# Patient Record
Sex: MALE | Race: WHITE | Employment: FULL TIME | URBAN - METROPOLITAN AREA
[De-identification: names, ages, dates, MRNs, and addresses within clinical notes are randomized per-mention and may not be internally consistent; named-entity substitution may affect disease eponyms.]

---

## 2020-01-22 ENCOUNTER — TELEPHONE (OUTPATIENT)
Dept: FAMILY MEDICINE CLINIC | Facility: CLINIC | Age: 40
End: 2020-01-22

## 2020-01-22 DIAGNOSIS — Z13.228 SCREENING FOR ENDOCRINE/METABOLIC/IMMUNITY DISORDERS: ICD-10-CM

## 2020-01-22 DIAGNOSIS — Z13.6 SCREENING FOR CARDIOVASCULAR CONDITION: ICD-10-CM

## 2020-01-22 DIAGNOSIS — Z13.0 SCREENING FOR ENDOCRINE/METABOLIC/IMMUNITY DISORDERS: ICD-10-CM

## 2020-01-22 DIAGNOSIS — Z13.29 SCREENING FOR ENDOCRINE/METABOLIC/IMMUNITY DISORDERS: ICD-10-CM

## 2020-01-22 DIAGNOSIS — Z13.0 SCREENING FOR IRON DEFICIENCY ANEMIA: Primary | ICD-10-CM

## 2020-01-22 NOTE — PATIENT INSTRUCTIONS
As of October 6th 2014, the Drug Enforcement Agency [facility]) is reclassifying all hydrocodone combination medications from Schedule III to Schedule II. This includes medications such as Norco, Vicodin, Lortab, Zohydro, and Vicoprofen.      What this means for It's an alert to a threat that is unknown, vague, or comes from your own internal fears. While you’re in this state, your feelings can range from a vague sense of worry to physical sensations such as a pounding heartbeat.  These feelings make you want to re that’s disrupting your life can be treated. Check with your healthcare provider and rule out any physical problems that may be causing the anxiety symptoms. If an anxiety disorder is diagnosed seek mental healthcare.  This is an illness and it can respond t blood pressure can decrease your risk of these problems. Know your blood pressure and remember to check it regularly. Doing so can save your life. Blood pressure measurements are given as 2 numbers. Systolic blood pressure is the upper number.  This is the sweets. · Get regular exercise. Get up and get active  · Choose activities you enjoy. Find ones you can do with friends or family. This includes bicycling, dancing, walking, and jogging. · Park farther away from building entrances.   · Use stairs inste

## 2020-01-24 PROBLEM — F10.20 ALCOHOL DEPENDENCE (HCC): Status: ACTIVE | Noted: 2020-01-24

## 2020-01-24 PROBLEM — F41.9 ANXIETY: Status: ACTIVE | Noted: 2020-01-24

## 2020-01-24 LAB
ABSOLUTE BASOPHILS: 50 CELLS/UL (ref 0–200)
ABSOLUTE EOSINOPHILS: 120 CELLS/UL (ref 15–500)
ABSOLUTE LYMPHOCYTES: 1000 CELLS/UL (ref 850–3900)
ABSOLUTE MONOCYTES: 320 CELLS/UL (ref 200–950)
ABSOLUTE NEUTROPHILS: 3510 CELLS/UL (ref 1500–7800)
ALBUMIN/GLOBULIN RATIO: 1.9 (CALC) (ref 1–2.5)
ALBUMIN: 5.2 G/DL (ref 3.6–5.1)
ALKALINE PHOSPHATASE: 56 U/L (ref 40–115)
ALT: 276 U/L (ref 9–46)
AST: 202 U/L (ref 10–40)
BASOPHILS: 1 %
BILIRUBIN, TOTAL: 1.6 MG/DL (ref 0.2–1.2)
BUN: 11 MG/DL (ref 7–25)
CALCIUM: 9.8 MG/DL (ref 8.6–10.3)
CARBON DIOXIDE: 27 MMOL/L (ref 20–32)
CHLORIDE: 98 MMOL/L (ref 98–110)
CHOL/HDLC RATIO: 2 (CALC)
CHOLESTEROL, TOTAL: 180 MG/DL
CREATININE: 0.77 MG/DL (ref 0.6–1.35)
EGFR IF AFRICN AM: 134 ML/MIN/1.73M2
EGFR IF NONAFRICN AM: 116 ML/MIN/1.73M2
EOSINOPHILS: 2.4 %
GLOBULIN: 2.8 G/DL (CALC) (ref 1.9–3.7)
GLUCOSE: 84 MG/DL (ref 65–99)
HDL CHOLESTEROL: 90 MG/DL
HEMATOCRIT: 44.5 % (ref 38.5–50)
HEMOGLOBIN: 16.1 G/DL (ref 13.2–17.1)
LDL-CHOLESTEROL: 78 MG/DL (CALC)
LYMPHOCYTES: 20 %
MCH: 34.3 PG (ref 27–33)
MCHC: 36.2 G/DL (ref 32–36)
MCV: 94.9 FL (ref 80–100)
MONOCYTES: 6.4 %
MPV: 9.6 FL (ref 7.5–12.5)
NEUTROPHILS: 70.2 %
NON-HDL CHOLESTEROL: 90 MG/DL (CALC)
PLATELET COUNT: 215 THOUSAND/UL (ref 140–400)
POTASSIUM: 3.7 MMOL/L (ref 3.5–5.3)
PROTEIN, TOTAL: 8 G/DL (ref 6.1–8.1)
RDW: 12.6 % (ref 11–15)
RED BLOOD CELL COUNT: 4.69 MILLION/UL (ref 4.2–5.8)
SODIUM: 135 MMOL/L (ref 135–146)
TRIGLYCERIDES: 42 MG/DL
TSH W/REFLEX TO FT4: 3.04 MIU/L (ref 0.4–4.5)
WHITE BLOOD CELL COUNT: 5 THOUSAND/UL (ref 3.8–10.8)

## 2020-01-24 NOTE — TELEPHONE ENCOUNTER
Labs reviewed. Abnormal LFTs, elevated. Needs US Liver. Likely is related EtOH.   Has appt on 1/29/20 with Dr. Daisha Elizabeth to discuss results and for physical.

## 2020-01-25 NOTE — PROGRESS NOTES
CHIEF COMPLAINT: Patient presents with: Other: patient c/o panic attack; heart beating fast, breathing problems, weak   Establish Care: new patient establishing care; not fasting        HPI:     Peter Garcia is a 42year old male presents for anxiety. Use      Smoking status: Never Smoker      Smokeless tobacco: Never Used    Alcohol use: Yes      Frequency: Monthly or less    Drug use: Never       Medications (Active prior to today's visit):  No current outpatient medications on file.        Allergies: 01/22/2020   Component Date Value   • WHITE BLOOD CELL COUNT 01/23/2020 5.0    • RED BLOOD CELL COUNT 01/23/2020 4.69    • HEMOGLOBIN 01/23/2020 16.1    • HEMATOCRIT 01/23/2020 44.5    • MCV 01/23/2020 94.9    • MCH 01/23/2020 34.3*   • MCHC 01/23/2020 36. without history of HTN  Elevated BP in office today, will review [facility] records, as well. If elevated at the next visit, will discuss medications.  In the meantime, encouraged pt to exercise regularly to lost weight, avoid high sodium foods, and to stop dr

## 2020-01-29 ENCOUNTER — OFFICE VISIT (OUTPATIENT)
Dept: FAMILY MEDICINE CLINIC | Facility: CLINIC | Age: 40
End: 2020-01-29
Payer: COMMERCIAL

## 2020-01-29 VITALS
TEMPERATURE: 99 F | WEIGHT: 167 LBS | HEIGHT: 64 IN | HEART RATE: 68 BPM | OXYGEN SATURATION: 98 % | DIASTOLIC BLOOD PRESSURE: 84 MMHG | BODY MASS INDEX: 28.51 KG/M2 | RESPIRATION RATE: 18 BRPM | SYSTOLIC BLOOD PRESSURE: 122 MMHG

## 2020-01-29 DIAGNOSIS — R79.89 ELEVATED LFTS: ICD-10-CM

## 2020-01-29 DIAGNOSIS — Z00.00 ENCOUNTER FOR ROUTINE ADULT HEALTH EXAMINATION WITHOUT ABNORMAL FINDINGS: Primary | ICD-10-CM

## 2020-01-29 DIAGNOSIS — F41.9 ANXIETY: ICD-10-CM

## 2020-01-29 PROCEDURE — 99395 PREV VISIT EST AGE 18-39: CPT | Performed by: FAMILY MEDICINE

## 2020-01-29 NOTE — PATIENT INSTRUCTIONS
As of October 6th 2014, the Drug Enforcement Agency [facility]) is reclassifying all hydrocodone combination medications from Schedule III to Schedule II. This includes medications such as Norco, Vicodin, Lortab, Zohydro, and Vicoprofen.      What this means for managing your health. A screening test is done to find possible disorders or diseases in people who don't have any symptoms.  The goal is to find a disease early so lifestyle changes can be made and you can be watched more closely to reduce the risk of dise infection or vaccine 2 doses; the second dose should be given at least 4 weeks after the first dose   Hepatitis A Men at increased risk for infection – talk with your healthcare provider 2 doses given at least 6 months apart   Hepatitis B Men at increased through age 25 At routine exams   1Those who are 25years of age, who are not up-to-date on their childhood immunizations, should receive all appropriate catch-up vaccines recommended by the CDC.   Date Last Reviewed: 2/1/2017  © 1038-3293 [employer]

## 2020-01-30 NOTE — PROGRESS NOTES
Patient presents with:  Physical      HPI:   Cisco Lyons is a 42year old male who presents for a complete physical exam.   Patient is present for complete physical. Feels very well. Up to date with dental visits. No hearing problems.  Vaccinations: up to d pertinent surgical history.    Family History   Problem Relation Age of Onset   • Cancer Father    • Diabetes Mother    • Other (Other) Maternal Grandmother    • Lung Disorder Maternal Grandfather    • Other (tuberculosis) Paternal Grandmother    • Diabetes normal and symmetric. MUSK/SKEL: Normal muscles tones, no joint abnormalities, full ROM of all extremities. back- normal curves, no scoliosis, normal gait,  No joint or muscle abnormalities. PSYCH: pt is alert, oriented x 3, normal affect.     ASSESSM

## 2020-02-03 ENCOUNTER — TELEPHONE (OUTPATIENT)
Dept: FAMILY MEDICINE CLINIC | Facility: CLINIC | Age: 40
End: 2020-02-03

## 2020-02-03 ENCOUNTER — HOSPITAL ENCOUNTER (OUTPATIENT)
Dept: ULTRASOUND IMAGING | Facility: HOSPITAL | Age: 40
Discharge: HOME | End: 2020-02-03
Attending: FAMILY MEDICINE
Payer: COMMERCIAL

## 2020-02-03 DIAGNOSIS — R79.89 ELEVATED LFTS: ICD-10-CM

## 2020-02-03 PROCEDURE — 76981 USE PARENCHYMA: CPT | Performed by: FAMILY MEDICINE

## 2020-02-03 PROCEDURE — 76705 ECHO EXAM OF ABDOMEN: CPT | Performed by: FAMILY MEDICINE

## 2020-02-03 NOTE — PROGRESS NOTES
Pt notified via [facility]:  US Liver showing hepatic steatosis. Minimal fibrosis seen (metavir score of F1, no tx indicated at this point). Recommend to cut down and avoid EtOH.

## 2020-02-03 NOTE — TELEPHONE ENCOUNTER
KIM, patient seeing a therapist today [id number] for his anxiety Cruz Lenz in [facility]- unable to add to care team  [address], [address] [place] [address]   [phone]

## 2020-02-07 ENCOUNTER — TELEPHONE (OUTPATIENT)
Dept: FAMILY MEDICINE CLINIC | Facility: CLINIC | Age: 40
End: 2020-02-07

## 2020-02-07 NOTE — TELEPHONE ENCOUNTER
Would like to talk to [facility] Colten Whitehead about test results and other issues.

## 2020-02-10 NOTE — TELEPHONE ENCOUNTER
Call placed to patient. He states he has reviewed his US results on mychart, and does not have further questions. He states that he went to his first session of therapy on Saturday, and it is going well- FYI Dr Baljinder Carson.

## 2020-03-18 ENCOUNTER — TELEPHONE (OUTPATIENT)
Dept: FAMILY MEDICINE CLINIC | Facility: CLINIC | Age: 40
End: 2020-03-18

## 2020-03-18 NOTE — TELEPHONE ENCOUNTER
Patient called to report he went to [facility] ED on [place] 3/15/2020 due to scratchy throat.  Released and advised to drink fluids  Patient reports he is fever free and symptoms free  Patient employer asking for a letter  Copy of letter provided to lien

## 2023-03-29 ENCOUNTER — PATIENT OUTREACH (OUTPATIENT)
Dept: CASE MANAGEMENT | Age: 43
End: 2023-03-29

## 2023-03-30 NOTE — PROCEDURES
The office order for PCP removal request is Approved and finalized on March 29, 2023.     Thanks,  [facility] [employer]

## 2024-10-22 ENCOUNTER — TELEPHONE (OUTPATIENT)
Dept: SCHEDULING | Age: 44
End: 2024-10-22

## 2024-10-24 ENCOUNTER — IMAGING SERVICES (OUTPATIENT)
Dept: GENERAL RADIOLOGY | Age: 44
End: 2024-10-24
Attending: FAMILY MEDICINE

## 2024-10-24 ENCOUNTER — OFFICE VISIT (OUTPATIENT)
Dept: SPORTS MEDICINE | Age: 44
End: 2024-10-24

## 2024-10-24 VITALS
DIASTOLIC BLOOD PRESSURE: 90 MMHG | SYSTOLIC BLOOD PRESSURE: 136 MMHG | HEART RATE: 76 BPM | HEIGHT: 65 IN | BODY MASS INDEX: 29.16 KG/M2 | WEIGHT: 175 LBS

## 2024-10-24 DIAGNOSIS — I10 HYPERTENSION, UNSPECIFIED TYPE: ICD-10-CM

## 2024-10-24 DIAGNOSIS — F41.9 ANXIETY: ICD-10-CM

## 2024-10-24 DIAGNOSIS — M23.321 DERANGEMENT OF POSTERIOR HORN OF MEDIAL MENISCUS OF RIGHT KNEE: ICD-10-CM

## 2024-10-24 DIAGNOSIS — M25.561 ACUTE PAIN OF RIGHT KNEE: Primary | ICD-10-CM

## 2024-10-24 DIAGNOSIS — M71.21 SYNOVIAL CYST OF RIGHT KNEE: ICD-10-CM

## 2024-10-24 DIAGNOSIS — M25.561 RIGHT KNEE PAIN, UNSPECIFIED CHRONICITY: ICD-10-CM

## 2024-10-24 DIAGNOSIS — M25.861 PATELLOFEMORAL DYSFUNCTION OF RIGHT KNEE: ICD-10-CM

## 2024-10-24 PROBLEM — M54.50 LOW BACK PAIN: Status: ACTIVE | Noted: 2024-10-24

## 2024-10-24 PROBLEM — L03.116 CELLULITIS OF LEFT LOWER LEG: Status: ACTIVE | Noted: 2017-02-28
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
## 2024-10-24 PROBLEM — J06.9 UPPER RESPIRATORY INFECTION: Status: ACTIVE | Noted: 2024-10-24

## 2024-10-24 PROBLEM — R06.00 DYSPNEA: Status: ACTIVE | Noted: 2024-10-24

## 2024-10-24 PROBLEM — L25.9 CONTACT DERMATITIS: Status: ACTIVE | Noted: 2024-10-24

## 2024-10-24 PROBLEM — F10.20 ALCOHOL DEPENDENCE (CMD): Status: ACTIVE | Noted: 2020-01-24

## 2024-10-24 PROBLEM — T81.72XA: Status: ACTIVE | Noted: 2024-10-24

## 2024-10-24 PROBLEM — I80.9: Status: ACTIVE | Noted: 2024-10-24

## 2024-10-24 PROBLEM — R07.89 NON-CARDIAC CHEST PAIN: Status: ACTIVE | Noted: 2024-10-24

## 2024-10-24 PROBLEM — S00.03XA HEMATOMA OF SCALP: Status: ACTIVE | Noted: 2024-10-24

## 2024-10-24 PROCEDURE — 73562 X-RAY EXAM OF KNEE 3: CPT | Performed by: RADIOLOGY

## 2024-10-24 PROCEDURE — 3075F SYST BP GE 130 - 139MM HG: CPT | Performed by: FAMILY MEDICINE

## 2024-10-24 PROCEDURE — 99204 OFFICE O/P NEW MOD 45 MIN: CPT | Performed by: FAMILY MEDICINE

## 2024-10-24 RX ORDER — NAPROXEN 250 MG/1
250 TABLET ORAL 2 TIMES DAILY WITH MEALS
COMMUNITY
End: 2024-10-24

## 2024-10-24 RX ORDER — NAPROXEN 500 MG/1
500 TABLET ORAL 2 TIMES DAILY WITH MEALS
Qty: 20 TABLET | Refills: 0 | Status: SHIPPED | OUTPATIENT
Start: 2024-10-24 | End: 2024-11-03

## 2024-10-29 ENCOUNTER — APPOINTMENT (OUTPATIENT)
Dept: PHYSICAL THERAPY | Age: 44
End: 2024-10-29

## 2024-10-29 PROBLEM — M25.561 ACUTE PAIN OF RIGHT KNEE: Status: ACTIVE | Noted: 2024-10-29

## 2024-10-29 PROBLEM — M23.321 DERANGEMENT OF POSTERIOR HORN OF MEDIAL MENISCUS OF RIGHT KNEE: Status: ACTIVE | Noted: 2024-10-29

## 2024-10-29 PROBLEM — M25.861 PATELLOFEMORAL DYSFUNCTION OF RIGHT KNEE: Status: ACTIVE | Noted: 2024-10-29

## 2024-11-04 ENCOUNTER — APPOINTMENT (OUTPATIENT)
Dept: PHYSICAL THERAPY | Age: 44
End: 2024-11-04
Attending: FAMILY MEDICINE

## 2024-11-05 ENCOUNTER — OFFICE VISIT (OUTPATIENT)
Dept: PHYSICAL THERAPY | Age: 44
End: 2024-11-05
Attending: FAMILY MEDICINE

## 2024-11-05 DIAGNOSIS — M25.561 ACUTE PAIN OF RIGHT KNEE: Primary | ICD-10-CM

## 2024-11-05 PROCEDURE — 97161 PT EVAL LOW COMPLEX 20 MIN: CPT | Performed by: PHYSICAL THERAPIST

## 2024-11-05 PROCEDURE — 97110 THERAPEUTIC EXERCISES: CPT | Performed by: PHYSICAL THERAPIST

## 2024-11-05 ASSESSMENT — MOVEMENT AND STRENGTH ASSESSMENTS
WALKING 2 BLOCKS: QUITE A BIT OF DIFFICULTY
MAKING SHARP TURNS WHILE RUNNING FAST: QUITE A BIT OF DIFFICULTY
STANDING FOR 1 HOUR: QUITE A BIT OF DIFFICULTY
RUNNING ON UNEVEN GROUND: QUITE A BIT OF DIFFICULTY
LIFTING AN OBJECT, LIKE A BAG OF GROCERIES, FROM THE FLOOR: QUITE A BIT OF DIFFICULTY
GETTING INTO OR OUT OF A CAR: QUITE A BIT OF DIFFICULTY
WALKING BETWEEN ROOMS: QUITE A BIT OF DIFFICULTY
PERFORMING HEAVY ACTIVITIES AROUND YOUR HOME: QUITE A BIT OF DIFFICULTY
YOUR USUAL HOBBIES, RECREATIONAL OR SPORTING ACTIVIITIES: QUITE A BIT OF DIFFICULTY
ROLLING OVER IN BED: MODERATE DIFFICULTY
GOING UP OR DOWN 10 STAIRS (ABOUT 1 FLIGHT OF STAIRS): QUITE A BIT OF DIFFICULTY
TOTAL SCORE: 27.5
RUNNING ON EVEN GROUND: MODERATE DIFFICULTY
GETTING INTO OR OUT OF THE BATH: QUITE A BIT OF DIFFICULTY
SITTING FOR 1 HOUR: QUITE A BIT OF DIFFICULTY
PERFORMING LIGHT ACTIVITES AROUND YOUR HOME: QUITE A BIT OF DIFFICULTY
PUTTING ON YOUR SHOES OR SOCKS: QUITE A BIT OF DIFFICULTY
SQUATTING: QUITE A BIT OF DIFFICULTY
ANY OF YOUR USUAL WORK, HOUSEWORK OR SCHOOL ACTIVITIES: QUITE A BIT OF DIFFICULTY
WALKING A MILE: QUITE A BIT OF DIFFICULTY
HOPPING: QUITE A BIT OF DIFFICULTY

## 2024-11-05 ASSESSMENT — ENCOUNTER SYMPTOMS
ALLEVIATING FACTORS: AVOIDING MOVEMENT IN INVOLVED AREA
QUALITY: STIFF
SUBJECTIVE PAIN PROGRESSION: WORSENING
ALLEVIATING FACTOR: SEE NARRATIVE FOR MORE DETAILS
PAIN LOCATION: SEE NARRATIVE FOR MORE DETAILS
PAIN SCALE AT LOWEST: 4
PAIN SEVERITY NOW: 4
PAIN SCALE AT HIGHEST: 8
PAIN DESCRIPTION: SEE NARRATIVE FOR MORE DETAILS

## 2024-11-06 ENCOUNTER — OFFICE VISIT (OUTPATIENT)
Dept: PHYSICAL THERAPY | Age: 44
End: 2024-11-06
Attending: FAMILY MEDICINE

## 2024-11-06 DIAGNOSIS — M25.561 ACUTE PAIN OF RIGHT KNEE: Primary | ICD-10-CM

## 2024-11-12 ENCOUNTER — APPOINTMENT (OUTPATIENT)
Dept: PHYSICAL THERAPY | Age: 44
End: 2024-11-12
Attending: FAMILY MEDICINE

## 2024-11-14 ENCOUNTER — APPOINTMENT (OUTPATIENT)
Dept: PHYSICAL THERAPY | Age: 44
End: 2024-11-14
Attending: FAMILY MEDICINE

## 2024-11-14 DIAGNOSIS — M25.561 ACUTE PAIN OF RIGHT KNEE: Primary | ICD-10-CM

## 2024-11-18 ENCOUNTER — APPOINTMENT (OUTPATIENT)
Dept: PHYSICAL THERAPY | Age: 44
End: 2024-11-18
Attending: FAMILY MEDICINE

## 2024-11-18 DIAGNOSIS — M25.561 ACUTE PAIN OF RIGHT KNEE: Primary | ICD-10-CM

## 2024-11-18 PROCEDURE — 97110 THERAPEUTIC EXERCISES: CPT | Performed by: PHYSICAL THERAPIST

## 2024-11-18 PROCEDURE — 97530 THERAPEUTIC ACTIVITIES: CPT | Performed by: PHYSICAL THERAPIST

## 2024-11-18 PROCEDURE — 97112 NEUROMUSCULAR REEDUCATION: CPT | Performed by: PHYSICAL THERAPIST

## 2024-11-20 ENCOUNTER — APPOINTMENT (OUTPATIENT)
Dept: PHYSICAL THERAPY | Age: 44
End: 2024-11-20
Attending: FAMILY MEDICINE

## 2024-11-20 DIAGNOSIS — M25.561 ACUTE PAIN OF RIGHT KNEE: Primary | ICD-10-CM

## 2024-11-20 PROCEDURE — 97110 THERAPEUTIC EXERCISES: CPT | Performed by: PHYSICAL THERAPIST

## 2024-11-20 PROCEDURE — 97530 THERAPEUTIC ACTIVITIES: CPT | Performed by: PHYSICAL THERAPIST

## 2024-11-20 PROCEDURE — 97112 NEUROMUSCULAR REEDUCATION: CPT | Performed by: PHYSICAL THERAPIST

## 2024-11-20 ASSESSMENT — MOVEMENT AND STRENGTH ASSESSMENTS
PERFORMING HEAVY ACTIVITIES AROUND YOUR HOME: QUITE A BIT OF DIFFICULTY
GETTING INTO OR OUT OF A CAR: QUITE A BIT OF DIFFICULTY
PUTTING ON YOUR SHOES OR SOCKS: A LITTLE BIT OF DIFFICULTY
YOUR USUAL HOBBIES, RECREATIONAL OR SPORTING ACTIVIITIES: QUITE A BIT OF DIFFICULTY
SITTING FOR 1 HOUR: A LITTLE BIT OF DIFFICULTY
TOTAL SCORE: 42.5
RUNNING ON UNEVEN GROUND: QUITE A BIT OF DIFFICULTY
SQUATTING: MODERATE DIFFICULTY
MAKING SHARP TURNS WHILE RUNNING FAST: QUITE A BIT OF DIFFICULTY
ANY OF YOUR USUAL WORK, HOUSEWORK OR SCHOOL ACTIVITIES: QUITE A BIT OF DIFFICULTY
LIFTING AN OBJECT, LIKE A BAG OF GROCERIES, FROM THE FLOOR: MODERATE DIFFICULTY
WALKING 2 BLOCKS: MODERATE DIFFICULTY
GOING UP OR DOWN 10 STAIRS (ABOUT 1 FLIGHT OF STAIRS): MODERATE DIFFICULTY
RUNNING ON EVEN GROUND: QUITE A BIT OF DIFFICULTY
STANDING FOR 1 HOUR: QUITE A BIT OF DIFFICULTY
ROLLING OVER IN BED: MODERATE DIFFICULTY
PERFORMING LIGHT ACTIVITES AROUND YOUR HOME: NO DIFFICULTY
WALKING A MILE: QUITE A BIT OF DIFFICULTY
GETTING INTO OR OUT OF THE BATH: QUITE A BIT OF DIFFICULTY
HOPPING: QUITE A BIT OF DIFFICULTY
WALKING BETWEEN ROOMS: A LITTLE BIT OF DIFFICULTY

## 2024-11-20 ASSESSMENT — ENCOUNTER SYMPTOMS
PAIN SEVERITY NOW: 5
PAIN SCALE AT LOWEST: 5
PAIN SCALE AT HIGHEST: 7

## 2024-11-22 ENCOUNTER — TELEPHONE (OUTPATIENT)
Dept: FAMILY MEDICINE | Age: 44
End: 2024-11-22

## 2024-11-22 ENCOUNTER — APPOINTMENT (OUTPATIENT)
Dept: SPORTS MEDICINE | Age: 44
End: 2024-11-22

## 2024-11-22 VITALS
BODY MASS INDEX: 29.16 KG/M2 | WEIGHT: 175 LBS | HEIGHT: 65 IN | DIASTOLIC BLOOD PRESSURE: 100 MMHG | HEART RATE: 80 BPM | SYSTOLIC BLOOD PRESSURE: 160 MMHG

## 2024-11-22 DIAGNOSIS — M25.561 ACUTE PAIN OF RIGHT KNEE: Primary | ICD-10-CM

## 2024-11-22 DIAGNOSIS — I16.0 ASYMPTOMATIC HYPERTENSIVE URGENCY: ICD-10-CM

## 2024-11-22 DIAGNOSIS — M23.321 DERANGEMENT OF POSTERIOR HORN OF MEDIAL MENISCUS OF RIGHT KNEE: ICD-10-CM

## 2024-11-22 DIAGNOSIS — M25.861 PATELLOFEMORAL DYSFUNCTION OF RIGHT KNEE: ICD-10-CM

## 2024-11-25 PROBLEM — J06.9 UPPER RESPIRATORY INFECTION: Status: RESOLVED | Noted: 2024-10-24 | Resolved: 2024-11-25

## 2024-11-25 PROBLEM — L03.116 CELLULITIS OF LEFT LOWER LEG: Status: RESOLVED | Noted: 2017-02-28 | Resolved: 2024-11-25

## 2024-11-25 PROBLEM — R74.8 ELEVATED LIVER ENZYMES: Status: ACTIVE | Noted: 2024-11-25

## 2024-11-26 ENCOUNTER — APPOINTMENT (OUTPATIENT)
Dept: PHYSICAL THERAPY | Age: 44
End: 2024-11-26
Attending: FAMILY MEDICINE

## 2024-12-03 ENCOUNTER — APPOINTMENT (OUTPATIENT)
Dept: PHYSICAL THERAPY | Age: 44
End: 2024-12-03
Attending: FAMILY MEDICINE

## 2024-12-05 ENCOUNTER — APPOINTMENT (OUTPATIENT)
Dept: PHYSICAL THERAPY | Age: 44
End: 2024-12-05
Attending: FAMILY MEDICINE

## 2025-09-10 ENCOUNTER — APPOINTMENT (OUTPATIENT)
Dept: FAMILY MEDICINE | Age: 45
End: 2025-09-10

## 2025-09-23 ENCOUNTER — APPOINTMENT (OUTPATIENT)
Dept: FAMILY MEDICINE | Age: 45
End: 2025-09-23

## (undated) NOTE — LETTER
Date: 1/22/2020    Patient Name: Boaz Auguste          To Whom it may concern: This letter has been written at the patient's request. The above patient was seen at the [facility] for treatment of a medical condition.     This patient should

## (undated) NOTE — LETTER
03/18/20      Dear Employer,      At [facility], we are taking special precautions and doing everything we can to prevent the spread of COVID-19 (coronavirus disease 2019).  During this time, we ask for your assistance regarding physician docume